# Patient Record
Sex: MALE | URBAN - METROPOLITAN AREA
[De-identification: names, ages, dates, MRNs, and addresses within clinical notes are randomized per-mention and may not be internally consistent; named-entity substitution may affect disease eponyms.]

---

## 2023-10-04 ENCOUNTER — WEB ENCOUNTER (OUTPATIENT)
Dept: URBAN - METROPOLITAN AREA CLINIC 82 | Facility: CLINIC | Age: 33
End: 2023-10-04

## 2023-10-10 ENCOUNTER — TELEPHONE ENCOUNTER (OUTPATIENT)
Dept: URBAN - METROPOLITAN AREA CLINIC 82 | Facility: CLINIC | Age: 33
End: 2023-10-10

## 2023-10-10 ENCOUNTER — OFFICE VISIT (OUTPATIENT)
Dept: URBAN - METROPOLITAN AREA CLINIC 82 | Facility: CLINIC | Age: 33
End: 2023-10-10
Payer: COMMERCIAL

## 2023-10-10 ENCOUNTER — LAB OUTSIDE AN ENCOUNTER (OUTPATIENT)
Dept: URBAN - METROPOLITAN AREA CLINIC 82 | Facility: CLINIC | Age: 33
End: 2023-10-10

## 2023-10-10 VITALS
DIASTOLIC BLOOD PRESSURE: 75 MMHG | SYSTOLIC BLOOD PRESSURE: 128 MMHG | HEIGHT: 73 IN | BODY MASS INDEX: 24.89 KG/M2 | WEIGHT: 187.8 LBS | TEMPERATURE: 97.6 F | HEART RATE: 78 BPM

## 2023-10-10 DIAGNOSIS — Z80.0 FAMILY HISTORY OF GASTRIC CANCER: ICD-10-CM

## 2023-10-10 DIAGNOSIS — K59.01 CONSTIPATION: ICD-10-CM

## 2023-10-10 DIAGNOSIS — R10.84 ABDOMINAL CRAMPING, GENERALIZED: ICD-10-CM

## 2023-10-10 PROBLEM — 285387005: Status: ACTIVE | Noted: 2023-10-10

## 2023-10-10 PROBLEM — 275108004: Status: ACTIVE | Noted: 2023-10-10

## 2023-10-10 PROCEDURE — 99204 OFFICE O/P NEW MOD 45 MIN: CPT | Performed by: STUDENT IN AN ORGANIZED HEALTH CARE EDUCATION/TRAINING PROGRAM

## 2023-10-10 NOTE — PHYSICAL EXAM GASTROINTESTINAL
Abdomen soft, nontender in RUQ/RLQ/LUQ/LLQ, nondistended , no guarding or rigidity , no masses palpable , unremarkable bowel sounds , no hepatosplenomegaly
No

## 2023-10-10 NOTE — HPI-TODAY'S VISIT:
33 y.o male presents today for concerns of left side abd dull-cramping x 2 years. Sx intermittent, comes and go. Unable to pinpoint if its triggers by meals or movements. Thinks it worse when patient is constipated. BM: once a day or once every other. Type 2-3 on BSFS or 5-6. Constipation for 2-3 yrs now. Denies any melena or hematochezia. No N/V. Occasional hearburns, triggers by acidic food. TUMS use prn. Admits binge drinking once every 3-6 months. No daily drinking. No smoking. No wt loss.  Mom recently  from gastric cancer around 53 y.o. No pancreatic or coloncancer. Patient had genetic panel completed, normal result. Colonoscopy 1.5 yrs ago for above concern, normal.  Repeat at age 46. No egd.

## 2023-11-07 ENCOUNTER — CLAIMS CREATED FROM THE CLAIM WINDOW (OUTPATIENT)
Dept: URBAN - METROPOLITAN AREA SURGERY CENTER 13 | Facility: SURGERY CENTER | Age: 33
End: 2023-11-07
Payer: COMMERCIAL

## 2023-11-07 ENCOUNTER — CLAIMS CREATED FROM THE CLAIM WINDOW (OUTPATIENT)
Dept: URBAN - METROPOLITAN AREA CLINIC 4 | Facility: CLINIC | Age: 33
End: 2023-11-07
Payer: COMMERCIAL

## 2023-11-07 DIAGNOSIS — Z80.0 FAMILY HISTORY OF GASTRIC CANCER: ICD-10-CM

## 2023-11-07 DIAGNOSIS — K29.60 OTHER GASTRITIS WITHOUT BLEEDING: ICD-10-CM

## 2023-11-07 DIAGNOSIS — R10.13 EPIGASTRIC ABDOMINAL PAIN: ICD-10-CM

## 2023-11-07 DIAGNOSIS — R10.13 EPIGASTRIC PAIN: ICD-10-CM

## 2023-11-07 DIAGNOSIS — K31.89 OTHER DISEASES OF STOMACH AND DUODENUM: ICD-10-CM

## 2023-11-07 DIAGNOSIS — K29.70 GASTRITIS: ICD-10-CM

## 2023-11-07 PROCEDURE — 00731 ANES UPR GI NDSC PX NOS: CPT | Performed by: ANESTHESIOLOGY

## 2023-11-07 PROCEDURE — 88305 TISSUE EXAM BY PATHOLOGIST: CPT | Performed by: PATHOLOGY

## 2023-11-07 PROCEDURE — 43239 EGD BIOPSY SINGLE/MULTIPLE: CPT | Performed by: INTERNAL MEDICINE

## 2023-11-07 PROCEDURE — 88312 SPECIAL STAINS GROUP 1: CPT | Performed by: PATHOLOGY

## 2023-11-07 PROCEDURE — 00731 ANES UPR GI NDSC PX NOS: CPT

## 2023-11-07 PROCEDURE — G8907 PT DOC NO EVENTS ON DISCHARG: HCPCS | Performed by: INTERNAL MEDICINE

## 2023-11-07 RX ORDER — OMEPRAZOLE 20 MG/1
1 CAPSULE 30 MINUTES BEFORE MORNING MEAL CAPSULE, DELAYED RELEASE ORAL ONCE A DAY
Qty: 90 | Refills: 3 | OUTPATIENT
Start: 2023-11-07

## 2023-12-08 ENCOUNTER — OFFICE VISIT (OUTPATIENT)
Dept: URBAN - METROPOLITAN AREA CLINIC 82 | Facility: CLINIC | Age: 33
End: 2023-12-08
Payer: COMMERCIAL

## 2023-12-08 ENCOUNTER — LAB OUTSIDE AN ENCOUNTER (OUTPATIENT)
Dept: URBAN - METROPOLITAN AREA CLINIC 82 | Facility: CLINIC | Age: 33
End: 2023-12-08

## 2023-12-08 ENCOUNTER — DASHBOARD ENCOUNTERS (OUTPATIENT)
Age: 33
End: 2023-12-08

## 2023-12-08 VITALS
SYSTOLIC BLOOD PRESSURE: 120 MMHG | BODY MASS INDEX: 24.92 KG/M2 | TEMPERATURE: 97.2 F | HEART RATE: 76 BPM | HEIGHT: 73 IN | WEIGHT: 188 LBS | DIASTOLIC BLOOD PRESSURE: 73 MMHG

## 2023-12-08 DIAGNOSIS — R10.9 LEFT SIDED ABDOMINAL PAIN: ICD-10-CM

## 2023-12-08 DIAGNOSIS — K29.70 GASTRITIS DETERMINED BY ENDOSCOPY: ICD-10-CM

## 2023-12-08 PROCEDURE — 99214 OFFICE O/P EST MOD 30 MIN: CPT | Performed by: STUDENT IN AN ORGANIZED HEALTH CARE EDUCATION/TRAINING PROGRAM

## 2023-12-08 RX ORDER — OMEPRAZOLE 20 MG/1
1 CAPSULE 30 MINUTES BEFORE MORNING MEAL CAPSULE, DELAYED RELEASE ORAL ONCE A DAY
Qty: 90 | Refills: 3 | Status: ACTIVE | COMMUNITY
Start: 2023-11-07

## 2023-12-08 NOTE — HPI-TODAY'S VISIT:
33 y.o male presents today for  F.U on EGD.   Recap of last visit: concerns of left side abd dull-cramping x 2 years. Sx intermittent, comes and go. Unable to pinpoint if its triggers by meals or movements. Thinks it worse when patient is constipated. BM: once a day or once every other. Type 2-3 on BSFS or 5-6. Constipation for 2-3 yrs now. Denies any melena or hematochezia. No N/V. Occasional hearburns, triggers by acidic food. TUMS use prn. Admits binge drinking once every 3-6 months. No daily drinking. No smoking. No wt loss.  Mom recently  from gastric cancer around 53 y.o. No pancreatic or coloncancer. Patient had genetic panel completed, normal result. Colonoscopy  for above concerns normal.   EGD on 2023 w Dr. Payton revealed: Normal esophagus. Gastritis. Normal examined duodenum. Bx result unremarkable.  At this visit, seems like abd pain somewhat controlled w/ daily fiber and working on a better diet to control constipation. Feels as though it flares when patient is constipated. Still having occasional LUQ pain but mostly stable/improved. Takes omeprazole 20mg daily but doesnt think it does anything to improve his sx. Denies any wt loss, nocturnal sx, n/v. No other concerns.

## 2023-12-18 ENCOUNTER — WEB ENCOUNTER (OUTPATIENT)
Dept: URBAN - METROPOLITAN AREA CLINIC 82 | Facility: CLINIC | Age: 33
End: 2023-12-18